# Patient Record
Sex: MALE | Race: OTHER | HISPANIC OR LATINO | Employment: UNEMPLOYED | URBAN - METROPOLITAN AREA
[De-identification: names, ages, dates, MRNs, and addresses within clinical notes are randomized per-mention and may not be internally consistent; named-entity substitution may affect disease eponyms.]

---

## 2022-10-24 ENCOUNTER — HOSPITAL ENCOUNTER (EMERGENCY)
Facility: HOSPITAL | Age: 32
Discharge: HOME | End: 2022-10-24
Attending: EMERGENCY MEDICINE

## 2022-10-24 VITALS
DIASTOLIC BLOOD PRESSURE: 70 MMHG | RESPIRATION RATE: 18 BRPM | TEMPERATURE: 99 F | OXYGEN SATURATION: 98 % | HEART RATE: 90 BPM | BODY MASS INDEX: 27 KG/M2 | HEIGHT: 67 IN | WEIGHT: 172 LBS | SYSTOLIC BLOOD PRESSURE: 112 MMHG

## 2022-10-24 DIAGNOSIS — R51.9 FACIAL PAIN: ICD-10-CM

## 2022-10-24 DIAGNOSIS — K02.9 DENTAL CARIES: Primary | ICD-10-CM

## 2022-10-24 DIAGNOSIS — R51.9 LEFT-SIDED HEADACHE: ICD-10-CM

## 2022-10-24 PROCEDURE — 99284 EMERGENCY DEPT VISIT MOD MDM: CPT

## 2022-10-24 RX ORDER — TRAMADOL HYDROCHLORIDE 50 MG/1
50 TABLET ORAL EVERY 6 HOURS PRN
Qty: 12 TABLET | Refills: 0 | Status: SHIPPED | OUTPATIENT
Start: 2022-10-24

## 2022-10-24 RX ORDER — AMOXICILLIN 500 MG/1
1000 CAPSULE ORAL EVERY 12 HOURS
Qty: 40 CAPSULE | Refills: 0 | Status: SHIPPED | OUTPATIENT
Start: 2022-10-24 | End: 2022-11-03

## 2022-10-24 NOTE — ED TRIAGE NOTES
Patient reports dental pain to the right side of mouth and left posterior head and neck pain x 2 days. Denies falls, trauma, mvc's.

## 2022-10-24 NOTE — ED PROVIDER NOTES
Encounter Date: 10/24/2022    SCRIBE #1 NOTE: I, Tash Ishan, am scribing for, and in the presence of,  Yung Lowe MD. I have scribed the following portions of the note - Other sections scribed: HPI, ROS.     History     Chief Complaint   Patient presents with    Dental Pain     Patient reports dental pain to the right side of mouth and left posterior head and neck pain x 2 days. Denies falls, trauma, mvc's.     Headache    Neck Pain     Pollo Eubanks is a 32 y.o. male, without a PMHx, who presents to the ED with dental pain in the upper right portion of his jaw for the past two days. Patient is also complaining of neck and left parietal head pain for the past two days. No other exacerbating or alleviating factors. Patient denies cough, shortness of breath, chest pain, fever, chills, abdominal pain, nausea, vomiting, diarrhea, dysuria, headaches, congestion, sore throat, arm or leg trouble, eye pain, ear pain, rash, or other associated symptoms.       The history is provided by the patient. No  was used.   Review of patient's allergies indicates:  No Known Allergies  History reviewed. No pertinent past medical history.  History reviewed. No pertinent surgical history.  History reviewed. No pertinent family history.     Review of Systems   Constitutional:  Negative for chills, diaphoresis and fever.   HENT:  Positive for dental problem (right upper jaw). Negative for congestion, ear pain and sore throat.    Eyes:  Negative for pain and visual disturbance.   Respiratory:  Negative for cough and shortness of breath.    Cardiovascular:  Negative for chest pain.   Gastrointestinal:  Negative for abdominal pain, diarrhea, nausea and vomiting.   Genitourinary:  Negative for dysuria.   Musculoskeletal:  Positive for neck pain. Negative for myalgias.   Skin:  Negative for rash.   Neurological:  Positive for headaches (left parietal).     Physical Exam     Initial Vitals [10/24/22 1316]   BP  Pulse Resp Temp SpO2   103/64 91 18 98.5 °F (36.9 °C) 98 %      MAP       --         Physical Exam  The patient was examined specifically for the following:   General:No significant distress, Good color, Warm and dry. Head and neck:Scalp atraumatic, Neck supple. Neurological:Appropriate conversation, Gross motor deficits. Eyes:Conjugate gaze, Clear corneas. ENT: No epistaxis. Cardiac: Regular rate and rhythm, Grossly normal heart tones. Pulmonary: Wheezing, Rales. Gastrointestinal: Abdominal tenderness, Abdominal distention. Musculoskeletal: Extremity deformity, Apparent pain with range of motion of the joints. Skin: Rash.   The findings on examination were normal except for the following:  Patient's carious molar in the right upper jaw.  There is no significant tenderness of the temples.  There is no midline cervical tenderness.  Status examination, cranial nerves, motor and sensory examination are normal.  Abdomen is soft.  ED Course   Procedures  Labs Reviewed - No data to display       Imaging Results    None       Medical decision making:  This patient presents emergency room complaining of a carry small in the right upper jaw.  He has right maxillary pain.  He also has some left parietal head pain.  There is no history of trauma fever or neck pain.  There are no neurologic deficits.  I will treat this patient symptomatically and have him follow up with primary care and dentistry.  I doubt intracranial bleeding.  There is no history of trauma.  I doubt meningitis the neck is supple.  There are no neurologic deficits I doubt intraparenchymal bleed.  There is no trismus or dysphagia.            ED Course   Procedures  Labs Reviewed - No data to display       Imaging Results    None          Medications - No data to display  Medical Decision Making:   History:   Old Medical Records: I decided to obtain old medical records.        Scribe Attestation:   Scribe #1: I performed the above scribed service and the  documentation accurately describes the services I performed. I attest to the accuracy of the note.                   Clinical Impression:   Final diagnoses:  [K02.9] Dental caries (Primary)  [R51.9] Facial pain  [R51.9] Left-sided headache   I personally performed the services described in this documentation.  All medical record  entries made by the scribe are at my direction and in my presence.  Signed, Dr. Lowe   ED Disposition Condition    Discharge Stable          ED Prescriptions       Medication Sig Dispense Start Date End Date Auth. Provider    traMADoL (ULTRAM) 50 mg tablet Take 1 tablet (50 mg total) by mouth every 6 (six) hours as needed for Pain. 12 tablet 10/24/2022 -- Yung Lowe MD    amoxicillin (AMOXIL) 500 MG capsule Take 2 capsules (1,000 mg total) by mouth every 12 (twelve) hours. for 20 doses 40 capsule 10/24/2022 11/3/2022 Yung Lowe MD          Follow-up Information       Follow up With Specialties Details Why Contact Info    Bang Haney MD Internal Medicine In 3 days  [address] [facility]  [place] [id number]  [phone]               Yung Lowe MD  10/25/22 1912

## 2022-10-24 NOTE — DISCHARGE INSTRUCTIONS
Please follow-up with the dentist as soon as possible about your carious molar.  Please return immediately if you get worse or if new problems develop.  Please follow-up with your primary care doctor the primary care doctor above about her headaches.  Tramadol and ibuprofen for head pain in dental pain.  Amoxicillin as directed.